# Patient Record
Sex: FEMALE | Race: WHITE | NOT HISPANIC OR LATINO | Employment: OTHER | URBAN - METROPOLITAN AREA
[De-identification: names, ages, dates, MRNs, and addresses within clinical notes are randomized per-mention and may not be internally consistent; named-entity substitution may affect disease eponyms.]

---

## 2018-10-05 ENCOUNTER — APPOINTMENT (EMERGENCY)
Dept: CT IMAGING | Facility: HOSPITAL | Age: 68
End: 2018-10-05
Payer: MEDICARE

## 2018-10-05 ENCOUNTER — HOSPITAL ENCOUNTER (EMERGENCY)
Facility: HOSPITAL | Age: 68
Discharge: HOME | End: 2018-10-05
Attending: EMERGENCY MEDICINE
Payer: MEDICARE

## 2018-10-05 VITALS
TEMPERATURE: 98.7 F | SYSTOLIC BLOOD PRESSURE: 103 MMHG | OXYGEN SATURATION: 95 % | RESPIRATION RATE: 19 BRPM | WEIGHT: 185 LBS | HEIGHT: 65 IN | BODY MASS INDEX: 30.82 KG/M2 | HEART RATE: 70 BPM | DIASTOLIC BLOOD PRESSURE: 55 MMHG

## 2018-10-05 DIAGNOSIS — S00.83XA HEMATOMA OF OCCIPITAL SURFACE OF HEAD, INITIAL ENCOUNTER: Primary | ICD-10-CM

## 2018-10-05 DIAGNOSIS — Z79.01 CHRONIC ANTICOAGULATION: ICD-10-CM
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
## 2018-10-05 DIAGNOSIS — S81.811A LACERATION OF RIGHT LOWER LEG, INITIAL ENCOUNTER: ICD-10-CM

## 2018-10-05 DIAGNOSIS — S01.01XA SCALP LACERATION, INITIAL ENCOUNTER: ICD-10-CM

## 2018-10-05 LAB
ANION GAP BLD CALC-SCNC: 16 MMOL/L (ref 4–13)
APTT PPP: 60 SECONDS (ref 24–36)
ATRIAL RATE: 71 BPM
BUN BLD-MCNC: 31 MG/DL (ref 5–25)
CA-I BLD-SCNC: 1.13 MMOL/L (ref 1.12–1.32)
CHLORIDE BLD-SCNC: 104 MMOL/L (ref 100–108)
CREAT BLD-MCNC: 1.4 MG/DL (ref 0.6–1.3)
GFR SERPL CREATININE-BSD FRML MDRD: 39 ML/MIN/1.73SQ M
GLUCOSE SERPL-MCNC: 108 MG/DL (ref 65–140)
HCT VFR BLD CALC: 32 % (ref 34.8–46.1)
HGB BLDA-MCNC: 10.9 G/DL (ref 11.5–15.4)
INR PPP: 2.87 (ref 0.86–1.17)
PCO2 BLD: 23 MMOL/L (ref 21–32)
POTASSIUM BLD-SCNC: 4.4 MMOL/L (ref 3.5–5.3)
PR INTERVAL: 114 MS
PROTHROMBIN TIME: 28.5 SECONDS (ref 11.8–14.2)
QRS AXIS: 108 DEGREES
QRSD INTERVAL: 128 MS
QT INTERVAL: 420 MS
QTC INTERVAL: 456 MS
SODIUM BLD-SCNC: 138 MMOL/L (ref 136–145)
SPECIMEN SOURCE: ABNORMAL
T WAVE AXIS: 86 DEGREES
VENTRICULAR RATE: 71 BPM

## 2018-10-05 PROCEDURE — 90471 IMMUNIZATION ADMIN: CPT

## 2018-10-05 PROCEDURE — 85610 PROTHROMBIN TIME: CPT | Performed by: PHYSICIAN ASSISTANT

## 2018-10-05 PROCEDURE — 93010 ELECTROCARDIOGRAM REPORT: CPT | Performed by: INTERNAL MEDICINE

## 2018-10-05 PROCEDURE — 70450 CT HEAD/BRAIN W/O DYE: CPT

## 2018-10-05 PROCEDURE — 93005 ELECTROCARDIOGRAM TRACING: CPT

## 2018-10-05 PROCEDURE — 80047 BASIC METABLC PNL IONIZED CA: CPT

## 2018-10-05 PROCEDURE — 36415 COLL VENOUS BLD VENIPUNCTURE: CPT | Performed by: PHYSICIAN ASSISTANT

## 2018-10-05 PROCEDURE — 99284 EMERGENCY DEPT VISIT MOD MDM: CPT

## 2018-10-05 PROCEDURE — 85014 HEMATOCRIT: CPT

## 2018-10-05 PROCEDURE — 90715 TDAP VACCINE 7 YRS/> IM: CPT | Performed by: PHYSICIAN ASSISTANT

## 2018-10-05 PROCEDURE — 85730 THROMBOPLASTIN TIME PARTIAL: CPT | Performed by: PHYSICIAN ASSISTANT

## 2018-10-05 RX ORDER — ACETAMINOPHEN 325 MG/1
650 TABLET ORAL ONCE
Status: COMPLETED | OUTPATIENT
Start: 2018-10-05 | End: 2018-10-05

## 2018-10-05 RX ORDER — LIDOCAINE HYDROCHLORIDE AND EPINEPHRINE 10; 10 MG/ML; UG/ML
10 INJECTION, SOLUTION INFILTRATION; PERINEURAL ONCE
Status: COMPLETED | OUTPATIENT
Start: 2018-10-05 | End: 2018-10-05

## 2018-10-05 RX ADMIN — Medication 1 APPLICATION: at 13:41

## 2018-10-05 RX ADMIN — ACETAMINOPHEN 650 MG: 325 TABLET, FILM COATED ORAL at 12:42

## 2018-10-05 RX ADMIN — LIDOCAINE HYDROCHLORIDE,EPINEPHRINE BITARTRATE 10 ML: 10; .01 INJECTION, SOLUTION INFILTRATION; PERINEURAL at 12:25

## 2018-10-05 RX ADMIN — TETANUS TOXOID, REDUCED DIPHTHERIA TOXOID AND ACELLULAR PERTUSSIS VACCINE, ADSORBED 0.5 ML: 5; 2.5; 8; 8; 2.5 SUSPENSION INTRAMUSCULAR at 12:25

## 2018-10-05 NOTE — ED PROVIDER NOTES
H&P Exam - Trauma   Maren Folds Ament 80 y o  female MRN: [id number]  Unit/Bed#: ED 09/ED 09 Encounter: [id number]    Assessment/Plan   Trauma Alert: Trauma Acuity: Trauma Evaluation  Model of Arrival: Trauma Mode of Arrival: BLS via Trauma Squad Name and Number: [address] Team: Current Providers  Attending Provider: Kenton Kearney MD  Registered Nurse: Sanchez Puckett, RN  Physician Assistant: Fredo Packer PA-C  Consultants: None    Trauma Active Problems:   Occipital Hematoma  Scalp laceration  R lower leg laceration  Chronic Anticoagulation  Ground Level Fall    Trauma Plan: CT head to r/o ICH, skull fx  Chem 8, PT/INR  Will close scalp lac w/ staples and R leg lac w/ suture    Chief Complaint:   Chief Complaint   Patient presents with    Fall     pt presents to ED via EMS following a fall       History of Present Illness   HPI:  Gege Olsen is a 80 y o  female w/ hx of a-fib on coumadin and CHF who presents to the Emergency Department for evaluation of head injury after mechanical GLF  States that she lost her balance while walking up the steps to her , falling backward, striking her occiput  No LOC  Fall witnessed  Pt reports brief episode of dizziness post fall, now resolved  No vision changes, neck pain, h/a, N/V, or extremity paresthesias  Mechanism:Details of Incident: Pt was at the hair dress[employer] and tripped on the last step and fell backwards hitting the back right of her head  Injury Date: 10/05/18 Injury Time: 1100      HPI  Review of Systems   Constitutional: Negative for diaphoresis and fatigue  Eyes: Negative for photophobia and visual disturbance  Respiratory: Negative for shortness of breath  Cardiovascular: Negative for chest pain  Gastrointestinal: Negative for abdominal pain, nausea and vomiting  Musculoskeletal: Negative for back pain and neck pain  Skin: Positive for wound  Allergic/Immunologic: Negative for immunocompromised state  Neurological: Positive for dizziness (brief, now resolved)  Negative for syncope, weakness, numbness and headaches  Hematological: Bruises/bleeds easily  Psychiatric/Behavioral: Negative for confusion  Historical Information     Immunizations:   Immunization History   Administered Date(s) Administered    Tdap 10/05/2018       Past Medical History:   Diagnosis Date    [facility]fib [facility])     CHF (congestive heart failure) ([facility] [address] )      History reviewed  No pertinent family history  Past Surgical History:   Procedure Laterality Date    CARDIAC SURGERY         Social History     Social History    Marital status: /Civil Union     Spouse name: N/A    Number of children: N/A    Years of education: N/A     Social History Main Topics    Smoking status: Former Smoker    Smokeless tobacco: Never Used    Alcohol use No    Drug use: No    Sexual activity: Not Asked     Other Topics Concern    None     Social History Narrative    None       Family History: non-contributory    Meds/Allergies   None       No Known Allergies    PHYSICAL EXAM    PE limited by: none    Objective   Vitals:   First set: Temperature: 98 7 °F (37 1 °C) (10/05/18 1148)  Pulse: 77 (10/05/18 1146)  Respirations: 17 (10/05/18 1146)  Blood Pressure: 125/72 (10/05/18 1146)  SpO2: 92 % (10/05/18 1146)    Primary Survey:   (A) Airway: Intact, self maintained  (B) Breathing: Regular and unlabored  (C) Circulation: Pulses:   2+ radially and DP  (D) Disabliity:  GCS Total:  15  (E) Expose:  Completed    Secondary Survey: (Click on Physical Exam tab above)  Physical Exam   Constitutional: She is oriented to person, place, and time  She appears well-developed and well-nourished  No distress  HENT:   Head: Head is without raccoon's eyes and without Lyons's sign  Right Ear: No hemotympanum  Left Ear: No hemotympanum  Nose: No nasal septal hematoma     Mouth/Throat: Uvula is midline and oropharynx is clear and moist    Large R occipitoparietal hematoma w/ 2 5cm laceration, actively bleeding  No crepitus   Eyes: Pupils are equal, round, and reactive to light  EOM are normal    Neck: Normal range of motion  No spinous process tenderness and no muscular tenderness present  No neck rigidity  Cardiovascular: Normal rate and regular rhythm  Exam reveals no gallop and no friction rub  No murmur heard  Pulmonary/Chest: No respiratory distress  She has no wheezes  She has no rales  Abdominal: Soft  Bowel sounds are normal  She exhibits no distension  There is no tenderness  There is no rebound and no guarding  Musculoskeletal: She exhibits edema (trace pretibial)  Neurological: She is alert and oriented to person, place, and time  No cranial nerve deficit  BUE and BLE strength and sensation intact in all fields and symmetric   Skin: Skin is warm and dry  She is not diaphoretic  No erythema  3cm linear full thickness laceration to R anterior lower leg  No active bleeding; minor serous discharge   Psychiatric: She has a normal mood and affect  Her behavior is normal  Judgment and thought content normal    Vitals reviewed        Invasive Devices          No matching active lines, drains, or airways          Lab Results:   Results Reviewed     Procedure Component Value Units Date/Time    APTT [[id number]]  (Abnormal) Collected:  10/05/18 1200    Lab Status:  Final result Specimen:  Blood from Arm, Left Updated:  10/05/18 1230     PTT 60 (H) seconds     Protime-INR [[id number]]  (Abnormal) Collected:  10/05/18 1200    Lab Status:  Final result Specimen:  Blood from Arm, Left Updated:  10/05/18 1229     Protime 28 5 (H) seconds      INR 2 87 (H)    POCT Chem 8+ [[id number]]  (Abnormal) Collected:  10/05/18 1210    Lab Status:  Final result Updated:  10/05/18 1215     SODIUM, I-STAT 138 mmol/l      Potassium, i-STAT 4 4 mmol/L      Chloride, istat 104 mmol/L      CO2, i-STAT 23 mmol/L      Anion Gap, Istat 16 (H) mmol/L      Calcium, Ionized i-STAT 1 13 mmol/L      BUN, I-STAT 31 (H) mg/dl      Creatinine, i-STAT 1 4 (H) mg/dl      eGFR 39 ml/min/1 73sq m      Glucose, i-STAT 108 mg/dl      Hct, i-STAT 32 (L) %      Hgb, i-STAT 10 9 (L) g/dl      Specimen Type VENOUS                 Imaging Studies:   TRAUMA - CT head wo contrast   Final Result by Fleeta Meigs, MD (10/05 1208)      No acute intracranial abnormality  Large right parieto-occipital scalp hematoma  No calvarial fracture  Workstation performed: [id number]             Other Studies: none    Code Status: No Order  Advance Directive and Living Will:      Power of :    POLST:      Procedures  ECG 12 Lead Documentation  Date/Time: 10/5/2018 12:00 PM  Performed by: Delmar Gibson by: Tessa Choe     Indications / Diagnosis:  Trauma  ECG reviewed by me, the ED Provider: yes    Patient location:  ED  Previous ECG:     Previous ECG:  Unavailable  Interpretation:     Interpretation: normal    Rate:     ECG rate:  71  Rhythm:     Rhythm: paced    Pacing:     Capture:  Complete    Type of pacing:  Atrial  Ectopy:     Ectopy: none    QRS:     QRS axis:  Normal    QRS intervals:  Normal  Conduction:     Conduction: abnormal      Abnormal conduction: complete RBBB    ST segments:     ST segments:  Normal  T waves:     T waves: normal      Lac Repair  Date/Time: 10/5/2018 12:30 PM  Performed by: Delmar Gibson by: Tessa Choe   Consent: Verbal consent obtained  Risks and benefits: risks, benefits and alternatives were discussed  Consent given by: patient  Patient understanding: patient states understanding of the procedure being performed  Patient consent: the patient's understanding of the procedure matches consent given  Radiology Images displayed and confirmed   If images not available, report reviewed: imaging studies available  Required items: required blood products, implants, devices, and special equipment available  Patient identity confirmed: verbally with patient  Time out: Immediately prior to procedure a "time out" was called to verify the correct patient, procedure, equipment, support staff and site/side marked as required  Body area: lower extremity  Location details: right lower leg  Laceration length: 3 cm  Foreign bodies: no foreign bodies  Tendon involvement: none  Nerve involvement: none  Vascular damage: no  Anesthesia: local infiltration    Anesthesia:  Local Anesthetic: lidocaine 1% with epinephrine  Anesthetic total: 3 mL    Sedation:  Patient sedated: no      Procedure Details:  Preparation: Patient was prepped and draped in the usual sterile fashion  Irrigation solution: saline  Irrigation method: syringe  Amount of cleaning: standard  Debridement: none  Degree of undermining: none  Skin closure: 4-0 nylon  Number of sutures: 2  Technique: horizontal mattress  Approximation: loose  Approximation difficulty: simple  Dressing: 4x4 sterile gauze  Patient tolerance: Patient tolerated the procedure well with no immediate complications    Lac Repair  Date/Time: 10/5/2018 1:15 PM  Performed by: Juan C Borges  Authorized by: Juan C Borges   Consent: Verbal consent obtained  Risks and benefits: risks, benefits and alternatives were discussed  Consent given by: patient  Patient understanding: patient states understanding of the procedure being performed  Patient consent: the patient's understanding of the procedure matches consent given  Procedure consent: procedure consent matches procedure scheduled  Relevant documents: relevant documents present and verified  Test results: test results available and properly labeled  Radiology Images displayed and confirmed   If images not available, report reviewed: imaging studies available  Required items: required blood products, implants, devices, and special equipment available  Patient identity confirmed: verbally with patient  Time out: Immediately prior to procedure a "time out" was called to verify the correct patient, procedure, equipment, support staff and site/side marked as required  Body area: head/neck  Location details: scalp  Laceration length: 3 cm  Foreign bodies: no foreign bodies  Tendon involvement: none  Nerve involvement: none  Vascular damage: no  Anesthesia: local infiltration    Anesthesia:  Local Anesthetic: lidocaine 1% with epinephrine and LET (lido,epi,tetracaine)  Anesthetic total: 8 mL    Sedation:  Patient sedated: no      Procedure Details:  Preparation: Patient was prepped and draped in the usual sterile fashion  Irrigation solution: saline  Irrigation method: syringe  Amount of cleaning: standard  Debridement: none  Degree of undermining: none  Skin closure: staples  Number of sutures: 5  Technique: simple  Approximation: close  Approximation difficulty: simple  Dressing: pressure dressing  Patient tolerance: Patient tolerated the procedure well with no immediate complications             Phone Contacts  ED Phone Contact     ED Course  ED Course as of Oct 05 1445   Fri Oct 05, 2018   1240 R lower leg lac repaired w/ sutures x2  Unable to locate specific site of bleed to scalp; substantial high pressure arteriolar bleeding noted  LET gel applied w/ pressure dressing  Will reassess in 20 min          MDM  Number of Diagnoses or Management Options  Chronic anticoagulation:   Fall from ground level: new and requires workup  Hematoma of occipital surface of head, initial encounter: new and requires workup  Laceration of right lower leg, initial encounter: new and requires workup  Scalp laceration, initial encounter: new and requires workup  Diagnosis management comments: 79 yo female presents w/ acute occipitoparietal hematoma and scalp laceration after ground level fall  CT head negative for ICH or skull fracture  INR therapeutic  She remained neurologically non focal throughout stay   Scalp laceration closed w/ staples; the lac was relatively difficulty to control secondary to copious bleeding from the hematoma  R lower leg lac required simple suture repair  She will see her PMD for staple removal in 5-7d, suture removal from the leg in 10-14d  Amount and/or Complexity of Data Reviewed  Clinical lab tests: ordered and reviewed  Tests in the radiology section of CPT®: ordered and reviewed  Tests in the medicine section of CPT®: ordered and reviewed  Decide to obtain previous medical records or to obtain history from someone other than the patient: yes  Obtain history from someone other than the patient: yes (EMS)  Review and summarize past medical records: yes  Independent visualization of images, tracings, or specimens: yes      CritCare Time    Disposition  Final diagnoses:   Hematoma of occipital surface of head, initial encounter   Scalp laceration, initial encounter   Laceration of right lower leg, initial encounter   Fall from ground level   Chronic anticoagulation     Time reflects when diagnosis was documented in both MDM as applicable and the Disposition within this note     Time User Action Codes Description Comment    10/5/2018  1:29 PM Beatrice Ochoa, [address] [facility] Hematoma of occipital surface of head, initial encounter     10/5/2018  1:29 PM Joshua Herron Add [S01 [id number]] Scalp laceration, initial encounter     10/5/2018  1:29 PM Joshua Herron Add [[id number] [id number]] Laceration of right lower leg, initial encounter     10/5/2018  1:29 PM Emiliano Mckenzie Fall from ground level     10/5/2018  1:29 PM Beatrice Ochoa, [address] [Z79 01] Chronic anticoagulation       ED Disposition     ED Disposition Condition Comment    Discharge  [place] discharge to home/self care  Condition at discharge: Good        Follow-up Information     Follow up With Specialties Details Why Agapito Hernandez [facility] [address]     [address] [place] [address]  [phone]          There are no discharge medications for this patient  No discharge procedures on file        ED Provider  Electronically Signed by         Charley Gallegos PA-C  10/05/18 2875

## 2018-10-05 NOTE — DISCHARGE INSTRUCTIONS
Scalp staples may come out in 5-7 days  Please keep this dry for 24 hours, then you may shower as normal  Right leg sutures may come out in 10-14 days  Please keep this dry for 48 hours    Laceration   AMBULATORY CARE:   A laceration  is an injury to the skin and the soft tissue underneath it  Lacerations happen when you are cut or hit by something  They can happen anywhere on the body  Common symptoms include the following:   · Injury or wound to skin and tissue of any shape size that looks like a cut, tear, or gash    · Edges of the wound may be close together or wide apart    · Pain, bleeding, bruising, or swelling    · Numbness around the wound    · Decreased movement in an area below the wound  Seek care immediately if:   · You have heavy bleeding or bleeding that does not stop after 10 minutes of holding firm, direct pressure over the wound  · Your wound opens up  Contact your healthcare provider if:   · You have a fever or chills  · Your laceration is red, warm, or swollen  · You have red streaks on your skin coming from your wound  · You have white or yellow drainage from the wound that smells bad  · You have pain that gets worse, even after treatment  · You have questions or concerns about your condition or care  Treatment for a laceration  includes care to stop any bleeding  Your healthcare provider will stop the bleeding by applying pressure to the wound  He may need to check your wound for foreign objects and clean it to decrease the chance of infection  Your laceration may be closed with stitches, staples, tissue glue, or medical strips  Ask your healthcare provider if you need a tetanus shot  Medicines:   · Prescription pain medicine  may be given  Ask how to take this medicine safely  · Antibiotics  help treat or prevent a bacterial infection  · Take your medicine as directed    Contact your healthcare provider if you think your medicine is not helping or if you have side effects  Tell him or her if you are allergic to any medicine  Keep a list of the medicines, vitamins, and herbs you take  Include the amounts, and when and why you take them  Bring the list or the pill bottles to follow-up visits  Carry your medicine list with you in case of an emergency  Care for your wound as directed:   · Do not get your wound wet  until your healthcare provider says it is okay  Do not soak your wound in water  Do not go swimming until your healthcare provider says it is okay  Carefully wash the wound with soap and water  Gently pat the area dry or allow it to air dry  · Change your bandages  when they get wet, dirty, or after washing  Apply new, clean bandages as directed  Do not apply elastic bandages or tape too tight  Do not put powders or lotions over your incision  · Apply antibiotic ointment as directed  Your healthcare provider may give you antibiotic ointment to put over your wound if you have stitches  If you have strips of tape over your incision, let them dry up and fall off on their own  If they do not fall off within 14 days, gently remove them  If you have glue over your wound, do not remove or pick at it  If your glue comes off, do not replace it with glue that you have at home  · Check your wound every day for signs of infection such as swelling, redness, or pus  Self-care:   · Apply ice  on your wound for 15 to 20 minutes every hour or as directed  Use an ice pack, or put crushed ice in a plastic bag  Cover it with a towel  Ice helps prevent tissue damage and decreases swelling and pain  · Use a splint as directed  A splint will decrease movement and stress on your wound  It may help it heal faster  A splint may be used for lacerations over joints or areas of your body that bend  Ask your healthcare provider how to apply and remove a splint  · Decrease scarring of your wound  by applying ointments as directed   Do not apply ointments until your healthcare provider says it is okay  You may need to wait until your wound is healed  Ask which ointment to buy and how often to use it  After your wound is healed, use sunscreen over the area when you are out in the sun  You should do this for at least 6 months to 1 year after your injury  Follow up with your healthcare provider as directed:  Write down your questions so you remember to ask them during your visits  © 2017 [facility] Information is for End User's use only and may not be sold, redistributed or otherwise used for commercial purposes  All illustrations and images included in CareNotes® are the copyrighted property of A D A M [employer]  or [employer]  The above information is an  only  It is not intended as medical advice for individual conditions or treatments  Talk to your doctor, nurse or pharmacist before following any medical regimen to see if it is safe and effective for you

## 2018-10-05 NOTE — TRAUMA DOCUMENTATION
When moving the pt's table in the room, table hit the pt's hand that was on the rail of her bed  Pt now has a skin tear on her left middle finger  Cleansed and covered with a 4x4  Pt states there is no pain from this tear